# Patient Record
Sex: FEMALE | Race: WHITE
[De-identification: names, ages, dates, MRNs, and addresses within clinical notes are randomized per-mention and may not be internally consistent; named-entity substitution may affect disease eponyms.]

---

## 2020-01-02 ENCOUNTER — HOSPITAL ENCOUNTER (EMERGENCY)
Dept: HOSPITAL 95 - ER | Age: 30
Discharge: HOME | End: 2020-01-02
Payer: OTHER GOVERNMENT

## 2020-01-02 VITALS — BODY MASS INDEX: 40.12 KG/M2 | HEIGHT: 64 IN | WEIGHT: 234.99 LBS

## 2020-01-02 DIAGNOSIS — Z88.0: ICD-10-CM

## 2020-01-02 DIAGNOSIS — N94.6: Primary | ICD-10-CM

## 2020-01-02 DIAGNOSIS — Z88.8: ICD-10-CM

## 2020-01-02 LAB
KETONES UR STRIP-MCNC: (no result) MG/DL
LEUKOCYTE ESTERASE UR QL STRIP: (no result)
PROT UR STRIP-MCNC: (no result) MG/DL
RBC #/AREA URNS HPF: (no result) /HPF (ref 0–2)
SP GR SPEC: 1.03 (ref 1–1.02)
UROBILINOGEN UR STRIP-MCNC: (no result) MG/DL
WBC #/AREA URNS HPF: (no result) /HPF (ref 0–5)

## 2020-10-30 ENCOUNTER — HOSPITAL ENCOUNTER (OUTPATIENT)
Dept: HOSPITAL 95 - ORSCSDS | Age: 30
Discharge: HOME | End: 2020-10-30
Attending: PODIATRIST
Payer: OTHER GOVERNMENT

## 2020-10-30 VITALS — WEIGHT: 234.35 LBS | HEIGHT: 64.02 IN | BODY MASS INDEX: 40.01 KG/M2

## 2020-10-30 DIAGNOSIS — K21.9: ICD-10-CM

## 2020-10-30 DIAGNOSIS — M67.471: Primary | ICD-10-CM

## 2020-10-30 DIAGNOSIS — E66.9: ICD-10-CM

## 2020-10-30 DIAGNOSIS — Z79.899: ICD-10-CM

## 2020-10-30 PROCEDURE — 0SBK0ZZ EXCISION OF RIGHT TARSOMETATARSAL JOINT, OPEN APPROACH: ICD-10-PCS | Performed by: PODIATRIST

## 2022-06-24 NOTE — NUR
06/24/22 1040 ASHLEY PAEZ
PATIENT WITH PCN/BENADRYL ALLERGIES. PT REPORTS RASH, HIVES, AND
NAUSEA REACTION W/PCN, LEG TWITCHING W/BENADRYL. PER MD ROSE AND
SINCERE MCCLURE, OK TO GIVE 5ML TEST DOSE OF ANCEF FOR REACTION CHECK.
CONFIRMED TWICE WITH MD AND PATIENT ON REACTION. PT REPORTS TAKING A
DERIVATIVE OF PCN PRIOR WITH NO REACTION. PT GIVEN 5 ML OF ANCEF IN
PRE OP AND ADVISED TO CALL FOR ANY TYPE OF ADVERSE REACTION. CALL
LIGHT WITHIN REACH AND O2 SENSOR ON.

## 2023-01-20 NOTE — NUR
01/20/23 9121 Gladys Queen
PATIENT UP IN RECLINER. EATING COOKIES AND DRINKING WATER WITHOUT
NAUSEA. FENTANYL 25MCG IV ADMINISTERED FOR 8-9/10 PAIN IN L
WRIST/HAND. ICE PACK PLACED